# Patient Record
Sex: MALE | Race: WHITE | NOT HISPANIC OR LATINO | Employment: OTHER | URBAN - METROPOLITAN AREA
[De-identification: names, ages, dates, MRNs, and addresses within clinical notes are randomized per-mention and may not be internally consistent; named-entity substitution may affect disease eponyms.]

---

## 2023-10-23 PROBLEM — C44.612 BASAL CELL CARCINOMA OF SKIN OF RIGHT UPPER LIMB, INCLUDING SHOULDER: Status: ACTIVE | Noted: 2023-08-02

## 2023-10-23 PROBLEM — D18.01 HEMANGIOMA OF SKIN AND SUBCUTANEOUS TISSUE: Status: ACTIVE | Noted: 2023-08-02

## 2023-10-23 PROBLEM — C44.529 SQUAMOUS CELL CARCINOMA OF SKIN OF OTHER PART OF TRUNK: Status: ACTIVE | Noted: 2023-08-02

## 2023-10-23 PROBLEM — C44.719 BASAL CELL CARCINOMA OF SKIN OF LEFT LOWER LIMB, INCLUDING HIP: Status: ACTIVE | Noted: 2023-08-02

## 2023-10-23 PROBLEM — E78.5 HYPERLIPIDEMIA: Status: ACTIVE | Noted: 2023-10-23

## 2023-10-23 PROBLEM — L81.4 OTHER MELANIN HYPERPIGMENTATION: Status: ACTIVE | Noted: 2023-08-02

## 2023-10-23 PROBLEM — L82.1 OTHER SEBORRHEIC KERATOSIS: Status: ACTIVE | Noted: 2023-08-02

## 2023-10-23 PROBLEM — D22.39 MELANOCYTIC NEVI OF OTHER PARTS OF FACE: Status: ACTIVE | Noted: 2023-08-02

## 2023-10-23 PROBLEM — D22.30 MELANOCYTIC NEVI OF UNSPECIFIED PART OF FACE: Status: ACTIVE | Noted: 2023-08-02

## 2023-10-23 PROBLEM — C44.319 BASAL CELL CARCINOMA OF SKIN OF OTHER PARTS OF FACE: Status: ACTIVE | Noted: 2023-08-02

## 2023-10-23 PROBLEM — L81.4 LENTIGINES: Status: ACTIVE | Noted: 2023-08-02

## 2023-10-23 PROBLEM — I25.10 ASHD (ARTERIOSCLEROTIC HEART DISEASE): Status: ACTIVE | Noted: 2023-10-23

## 2023-10-23 PROBLEM — C43.62 MALIGNANT MELANOMA OF LEFT UPPER LIMB, INCLUDING SHOULDER (MULTI): Status: ACTIVE | Noted: 2023-08-02

## 2023-10-23 PROBLEM — L73.8 OTHER SPECIFIED FOLLICULAR DISORDERS: Status: ACTIVE | Noted: 2023-08-02

## 2023-10-23 PROBLEM — L57.9 SKIN CHANGES DUE TO CHRONIC EXPOSURE TO NONIONIZING RADIATION, UNSPECIFIED: Status: ACTIVE | Noted: 2023-08-02

## 2023-10-23 PROBLEM — L90.5 SCAR CONDITION AND FIBROSIS OF SKIN: Status: ACTIVE | Noted: 2023-08-02

## 2023-10-23 PROBLEM — L57.0 ACTINIC KERATOSIS: Status: ACTIVE | Noted: 2023-08-02

## 2023-10-23 PROBLEM — D22.4 MELANOCYTIC NEVI OF SCALP AND NECK: Status: ACTIVE | Noted: 2023-08-02

## 2023-10-23 PROBLEM — D48.5 NEOPLASM OF UNCERTAIN BEHAVIOR OF SKIN: Status: ACTIVE | Noted: 2023-08-02

## 2023-10-23 PROBLEM — L91.8 OTHER HYPERTROPHIC DISORDERS OF THE SKIN: Status: ACTIVE | Noted: 2023-08-02

## 2023-10-23 PROBLEM — C44.329 SQUAMOUS CELL CARCINOMA OF SKIN OF OTHER PARTS OF FACE: Status: ACTIVE | Noted: 2023-08-02

## 2023-10-23 PROBLEM — I21.4 NON-ST ELEVATION (NSTEMI) MYOCARDIAL INFARCTION (MULTI): Status: ACTIVE | Noted: 2023-10-23

## 2023-10-23 PROBLEM — R41.3 MEMORY LOSS: Status: ACTIVE | Noted: 2023-10-23

## 2023-10-23 PROBLEM — D49.2 NEOPLASM OF UNSPECIFIED BEHAVIOR OF BONE, SOFT TISSUE, AND SKIN: Status: ACTIVE | Noted: 2023-08-02

## 2023-10-23 PROBLEM — D04.4 CARCINOMA IN SITU OF SKIN OF SCALP AND NECK: Status: ACTIVE | Noted: 2023-08-02

## 2023-10-23 PROBLEM — L91.0 HYPERTROPHIC SCAR: Status: ACTIVE | Noted: 2023-08-02

## 2023-10-23 PROBLEM — Z85.820 PERSONAL HISTORY OF MALIGNANT MELANOMA OF SKIN: Status: ACTIVE | Noted: 2023-08-02

## 2023-10-23 PROBLEM — C44.519 BASAL CELL CARCINOMA OF SKIN OF OTHER PART OF TRUNK: Status: ACTIVE | Noted: 2023-08-02

## 2023-10-23 PROBLEM — D22.62 MELANOCYTIC NEVI OF LEFT UPPER LIMB, INCLUDING SHOULDER: Status: ACTIVE | Noted: 2023-08-02

## 2023-10-23 PROBLEM — D22.5 MELANOCYTIC NEVI OF TRUNK: Status: ACTIVE | Noted: 2023-08-02

## 2023-10-23 PROBLEM — Z85.828 PERSONAL HISTORY OF OTHER MALIGNANT NEOPLASM OF SKIN: Status: ACTIVE | Noted: 2023-08-02

## 2023-10-23 PROBLEM — Z85.820 HISTORY OF MELANOMA: Status: ACTIVE | Noted: 2023-08-02

## 2023-10-23 PROBLEM — D22.70 MELANOCYTIC NEVI OF UNSPECIFIED LOWER LIMB, INCLUDING HIP: Status: ACTIVE | Noted: 2023-08-02

## 2023-10-23 PROBLEM — C44.619 BASAL CELL CARCINOMA OF SKIN OF LEFT UPPER LIMB, INCLUDING SHOULDER: Status: ACTIVE | Noted: 2023-08-02

## 2023-10-23 PROBLEM — D23.9 OTHER BENIGN NEOPLASM OF SKIN, UNSPECIFIED: Status: ACTIVE | Noted: 2023-08-02

## 2023-10-23 PROBLEM — D04.71 CARCINOMA IN SITU OF SKIN OF RIGHT LOWER LIMB, INCLUDING HIP: Status: ACTIVE | Noted: 2023-08-02

## 2023-10-23 RX ORDER — EZETIMIBE 10 MG/1
1 TABLET ORAL DAILY
COMMUNITY
Start: 2021-05-04

## 2023-10-23 RX ORDER — ROSUVASTATIN CALCIUM 40 MG/1
1 TABLET, COATED ORAL DAILY
COMMUNITY
Start: 2016-11-17

## 2023-10-23 RX ORDER — FLUOROURACIL 50 MG/G
1 CREAM TOPICAL
COMMUNITY
Start: 2015-08-11 | End: 2024-05-15 | Stop reason: WASHOUT

## 2023-10-23 RX ORDER — METOPROLOL TARTRATE 25 MG/1
1 TABLET, FILM COATED ORAL 2 TIMES DAILY
COMMUNITY
Start: 2016-07-01

## 2023-10-23 RX ORDER — LOSARTAN POTASSIUM 25 MG/1
1 TABLET ORAL DAILY
COMMUNITY
Start: 2020-01-14

## 2023-10-23 RX ORDER — TRIAMCINOLONE ACETONIDE 1 MG/G
1 CREAM TOPICAL 2 TIMES DAILY PRN
COMMUNITY
Start: 2023-04-25 | End: 2024-05-16 | Stop reason: WASHOUT

## 2023-10-23 RX ORDER — ASPIRIN 81 MG/1
1 TABLET ORAL DAILY
COMMUNITY
Start: 2016-07-06

## 2023-10-23 RX ORDER — EPINEPHRINE 0.22MG
1 AEROSOL WITH ADAPTER (ML) INHALATION DAILY
COMMUNITY
Start: 2016-07-06

## 2023-10-23 RX ORDER — NITROGLYCERIN 0.4 MG/1
0.4 TABLET SUBLINGUAL EVERY 5 MIN PRN
COMMUNITY
Start: 2016-07-06

## 2023-10-23 RX ORDER — DESONIDE 0.5 MG/G
1 CREAM TOPICAL
COMMUNITY
Start: 2015-08-11 | End: 2024-05-15 | Stop reason: WASHOUT

## 2023-10-25 ENCOUNTER — OFFICE VISIT (OUTPATIENT)
Dept: DERMATOLOGY | Facility: CLINIC | Age: 73
End: 2023-10-25
Payer: MEDICARE

## 2023-10-25 DIAGNOSIS — L90.5 SCAR: ICD-10-CM

## 2023-10-25 DIAGNOSIS — L81.4 LENTIGO: ICD-10-CM

## 2023-10-25 DIAGNOSIS — L57.0 BOWENOID ACTINIC KERATOSIS: ICD-10-CM

## 2023-10-25 DIAGNOSIS — L82.1 SEBORRHEIC KERATOSIS: ICD-10-CM

## 2023-10-25 DIAGNOSIS — Z85.828 HISTORY OF BASAL CELL CANCER: ICD-10-CM

## 2023-10-25 DIAGNOSIS — Z12.83 SCREENING EXAM FOR SKIN CANCER: ICD-10-CM

## 2023-10-25 DIAGNOSIS — D18.01 HEMANGIOMA OF SKIN: ICD-10-CM

## 2023-10-25 DIAGNOSIS — L57.0 ACTINIC KERATOSIS: Primary | ICD-10-CM

## 2023-10-25 DIAGNOSIS — Z85.820 PERSONAL HISTORY OF MALIGNANT MELANOMA OF SKIN: ICD-10-CM

## 2023-10-25 DIAGNOSIS — D22.9 MULTIPLE BENIGN NEVI: ICD-10-CM

## 2023-10-25 DIAGNOSIS — Z85.89 HISTORY OF SQUAMOUS CELL CARCINOMA: ICD-10-CM

## 2023-10-25 PROCEDURE — 1126F AMNT PAIN NOTED NONE PRSNT: CPT | Performed by: DERMATOLOGY

## 2023-10-25 PROCEDURE — 17003 DESTRUCT PREMALG LES 2-14: CPT

## 2023-10-25 PROCEDURE — 17000 DESTRUCT PREMALG LESION: CPT

## 2023-10-25 PROCEDURE — 99213 OFFICE O/P EST LOW 20 MIN: CPT | Performed by: DERMATOLOGY

## 2023-10-25 NOTE — PROGRESS NOTES
I saw and evaluated the patient. I personally obtained the key and critical portions of the history and physical exam or was physically present for key and critical portions performed by the resident/fellow. I reviewed the resident/fellow's documentation and discussed the patient with the resident/fellow. I agree with the resident/fellow's medical decision making as documented in the note.    Pk Rodriguez MD

## 2023-10-25 NOTE — PATIENT INSTRUCTIONS
Mr. Rodriguez,     It was great seeing you in Dr. Rodriguez's clinic today.     We saw a few precancerous skin lesions (actinic keratoses) on your skin today which we treated with cryotherapy to prevent them from turning into skin cancer. We also treated the spot on your left medial wrist which we biopsied at the last visit. No spots were concerning for skin cancer. We hope you enjoy your time in [place]! If you would like to see a dermatologist while you are in [place] we recommend Dr. Archana Mejía. Her phone number is [phone].

## 2023-10-25 NOTE — PROGRESS NOTES
Subjective     Regan Rodriguez is a 73 y.o. male with history of multiple melanoma and NMSC who presents for the following: 3-month Skin Check and cryotherapy treatment to biopsy proven bowenoid actinic keratosis on right lateral wrist.     At last visit (8/2/23), patient had two biopsies. Biopsy of mid posterior scalp showed atypical squamous proliferation c/f invasive SCC s/p Mohs with Dr. Quintana on 9/27/23.   Biopsy of right lateral wrist demonstrated bowenoid actinic keratosis. Here today for cryotherapy of bowenoid actinic keratosis on right lateral wrist.  Patient denies any lesions of concern today.     Review of Systems:  No other skin or systemic complaints other than what is documented elsewhere in the note.    The following portions of the chart were reviewed this encounter and updated as appropriate:         Skin Cancer History  - Melanoma 1: Stage IIB (T3N0M0) Year Diagnosed: 2006. February. Location: Right Shoulder. Treatment(s): Wide Local Excision with 2 cm margins and 1 year of interferon Breslow's Thickness: 2.8 mm Negative SLNB Right axilla     - Melanoma 2: Stage IA Year Diagnosed: 2010. December. Location: Right Mid Back. Treatment(s): Wide Local Excision with 1 cm margins Breslow's Thickness: .4 mm No mitotic figures Ulceration present thought to be 2/2 trauma      - Melanoma 3: Year Diagnosed: 2016. October. Location: Left Forearm. Treatment(s): Slow mohs Type: Melanoma insitu     History of NMSC(s)/Dysplastic Nevi   - Moderately dysplastic nevus Treated in 12/2015, 2/2016 (re-excision) Location: Left posterior shoulder Treatment: WLE    - SCCIS. Year Diagnosed: 2013. Location: Right [place]ze. Treatment(s): Mohs.     - BCCx2 Year Diagnosed: 2013. March. Location: right upper back, left lateral leg Treatment(s): R upper back (mohs as it was a recurrence), left lateral leg (ED&C)     - SCCIS. Year Diagnosed: 2012. December. Location: Right Superior Forearm. Treatment(s): Local Excision.     - BCC.  Year Diagnosed: 2012. December. Location: Right Inferior Forearm. Treatment(s): Electrodessication and curretage     - SCCIS. Year Diagnosed: 2012. January. Location: Left Superior Chest. Treatment(s): Electrodessication and curretage     - BCC x 2 Year Diagnosed: 2011. October. Location: Right Chest. Left Forearm. Treatment(s): Electrodessication and curretage     - SCCIS. Year Diagnosed: 2010. December. Location: Right Upper Back. Treatment(s): Electrodessication and curretage     - BCC. Year Diagnosed: 2010. November. Location: Right Preauricular Area. Treatment(s): Mohs.     - BCC. Year Diagnosed: 2010. January. Location: Left Wrist.     - Superficial Basal Cell Carcinoma. Lateral margin involved. Deep margins involved. Year Diagnosed: 2016. May. Location: Left Lateral Arm. Treatment(s): Pending Pathology: [id number]     - Basal Cell Carcinoma. Superficial Basal Cell Carcinoma. Year Diagnosed: 2016. December. Location: Left Anterior Shoulder Treatment(s): C & E w/ KDC on 1/11/17 Pathology: [id number]     - Superficial Basal Cell Carcinoma. Deep margins involved. Year Diagnosed: 2017. June. Location: Right Arm Treatment(s): S/p excision 6/29/17 Pathology: [id number]     - Squamous Cell Carcinoma. ISLateral margin involved. Deep margins involved. Year Diagnosed: 2018. April. Location: Vertex Scalp (Anterior) Treatment(s): S/p Mohs 5/10/18 Pathology: [id number]     - Superficial Basal Cell Carcinoma. Lateral margin involved. Deep margins involved. Year Diagnosed: 2018. April. Location: Right Clavicle. Treatment(s): Exc 6/19/18 Dr. Baron Pathology: [id number]     - AK with focal SCCis Diagnosed: 12/5/2018 Location: Mid-Back Treatment: Excision 2/8/2019 Pathology: [id number]     - Superficial Basal Cell Carcinoma. Lateral margin involved. Deep margins involved. Year Diagnosed: 2019. July. Location: Right Shoulder. Treatment(s): WLE Dr. Quintana 8/28/19 Pathology: [id number]     - Invasive squamous cell carcinoma. Deep margins  involved. Year Diagnosed: 2019. October. Location: Left Hugo. Treatment(s): Mohs Dr. Quintana 12/5/19 Pathology: [id number]     - Invasive squamous cell carcinoma. Deep margins involved. Year Diagnosed: 2019. October. Location: Left Buccal. Cheek. Treatment(s): Mohs 12/12/19 by Dr. Mariano MD Pathology: [id number]     - Actinic Keratosis. Year Diagnosed: 2019. October. Location: Left Vertex. Scalp. Treatment(s): LN2 1/15/2020 Pathology: [id number]     - Actinic Keratosis. Year Diagnosed: 2019. October. Location: Right Lateral Frontal Scalp. Treatment(s): LN2 1/15/2020 Pathology: [id number]     - Superficial Basal Cell Carcinoma. Deep margins involved. Year Diagnosed: 2020. January. Location: Rt Anterior Shoulder Treatment(s): ED&C (Dr. Rodriguez) 4/29/20 Pathology:      - Moderate Dysplastic Nevus. Year Diagnosed: 2020. December. Location: right mid medial back Treatment(s): Wide Local Excision (performed in [place]     - Moderate Dysplastic Nevus. Year Diagnosed: 2020. December.  Location: left mid medial back Treatment(s): Wide Local Excision (performed in [place])    - Nodular Basal Cell Carcinoma. Infiltrative Basal Cell Carcinoma. Lateral margin involved. Deep margins involved. Year Diagnosed: 2021. July. Location: Central Frontal Forehead [facility] Pathology: [id number]     - Proliferative Actinic Keratosis on prior procedural site Year Diagnosed: 2021. July. Location: Right upper chest Treatment(s): pending Pathology: [id number]    -Superficial Basal Cell Carcinoma. reccomending excisionLateral margin involved. Year Diagnosed: 2021. July. Location: left distal leg Treatment(s): pending Pathology: [id number]     -Squamous Cell Carcinoma IS Year Diagnosed: 2021. October. Location: Left Superior flank Treatment(s): excisional biospy 11/30/2021; pending path Pathology: [id number] margins involved.     -Squamous Cell Carcinoma. ISLateral margin involved. Year Diagnosed: 2022. April.  Location: Right Mid Back Treatment(s): WLE 4mm margins Dr Quintana 6/29/22 Pathology: [id number]     - Squamous Cell Carcinoma. ISLateral margin involved. Year Diagnosed: 2022. April. Location: Right Base of Thumb Treatment(s): Mohs Dr. Quintana 6/7/22 Pathology: [id number]    - Atypical squamous proliferation c/f invasive SCC on mid posterior scalp s/p Mohs with Dr. Quintana on 9/27/23.     Specialty Problems          Dermatology Problems    Actinic keratosis    Basal cell carcinoma of skin of left lower limb, including hip    Basal cell carcinoma of skin of left upper limb, including shoulder    Basal cell carcinoma of skin of other part of trunk    Basal cell carcinoma of skin of other parts of face    Basal cell carcinoma of skin of right upper limb, including shoulder    Carcinoma in situ of skin of right lower limb, including hip    Carcinoma in situ of skin of scalp and neck    Hemangioma of skin and subcutaneous tissue    History of melanoma    Hypertrophic scar    Lentigines    Malignant melanoma of left upper limb, including shoulder (CMS/HCC)    Melanocytic nevi of left upper limb, including shoulder    Melanocytic nevi of other parts of face    Melanocytic nevi of scalp and neck    Melanocytic nevi of trunk    Melanocytic nevi of unspecified lower limb, including hip    Melanocytic nevi of unspecified part of face    Neoplasm of uncertain behavior of skin    Other benign neoplasm of skin, unspecified    Other hypertrophic disorders of the skin    Other melanin hyperpigmentation    Other seborrheic keratosis    Other specified follicular disorders    Personal history of malignant melanoma of skin    Personal history of other malignant neoplasm of skin    Scar condition and fibrosis of skin    Skin changes due to chronic exposure to nonionizing radiation, unspecified    Squamous cell carcinoma of skin of other parts of face    Squamous cell carcinoma of skin of other part of trunk        Objective   Well  appearing patient in no apparent distress; mood and affect are within normal limits.    A full examination was performed including scalp, head, eyes, ears, nose, lips, neck, chest, axillae, abdomen, back, buttocks, bilateral upper extremities, bilateral lower extremities, hands, feet, fingers, toes, fingernails, and toenails. All findings within normal limits unless otherwise noted below.    Assessment/Plan   1. Actinic keratosis (5)  Left Parotid Area, Right Dorsal Hand, Right Elbow - Posterior, Right Forehead, Right Mid Helix  Erythematous macules with gritty scale.    Destr of lesion - Left Parotid Area, Right Dorsal Hand, Right Elbow - Posterior, Right Forehead, Right Mid Helix  Complexity: simple    Destruction method: cryotherapy    Informed consent: discussed and consent obtained    Lesion destroyed using liquid nitrogen: Yes    Cryotherapy cycles:  1  Outcome: patient tolerated procedure well with no complications    Post-procedure details: wound care instructions given      2. Bowenoid actinic keratosis  Left Lateral Wrist  -Biopsy results/prior notes indicate biopsy was from right lateral wrist.   -Patient states biopsy was taken from left wrist. Reviewed photographs, appears biopsy was taken from left lateral wrist.       Destr of lesion - Left Lateral Wrist  Complexity: simple    Destruction method: cryotherapy    Informed consent: discussed and consent obtained    Lesion destroyed using liquid nitrogen: Yes    Cryotherapy cycles:  1  Outcome: patient tolerated procedure well with no complications    Post-procedure details: wound care instructions given      3. Lentigo  Scattered tan macules in sun-exposed areas.    - Discussed benign nature and that no treatment is necessary unless it becomes painful or increases in size. Patient opts for clinical monitoring at this time.     4. Hemangioma of skin  Scattered cherry-red papule(s).    - Discussed benign nature and that no treatment is necessary unless it  becomes painful or increases in size. Patient opts for clinical monitoring at this time.     5. Multiple benign nevi  Scattered, uniform and benign-appearing, regular brown melanocytic papules and macules.    - Discussed benign nature and that no treatment is necessary unless it becomes painful or increases in size. Patient opts for clinical monitoring at this time.     6. Seborrheic keratosis  Stuck on verrucous, tan-brown papules and plaques.      - Discussed benign nature and that no treatment is necessary unless it becomes painful or increases in size. Patient opts for clinical monitoring at this time.     7. Scar  Surgical scars are well-healed with no evidence of recurrence    8. Screening exam for skin cancer    Full body skin exam  -No lesions concerning for malignancy on the remainder the skin exam today   -Scars from prior skin treatments were evaluated and no evidence of recurrence.  - The ugly duckling sign was discussed. Monitor for any skin lesions that are different in color, shape, or size than others on body  -Sun protection was discussed. Recommend SPF 30+, hats with brims, sun protective clothing, and avoiding sun exposure between 10 AM and 2 PM whenever possible  -Recommend regular skin exams or sooner if new or changing lesions       9. Personal history of malignant melanoma of skin  -No palpable lymphadenopathy in the cervical, axillary, inguinal, and popliteal lymph nodes.       10. History of squamous cell carcinoma    11. History of basal cell cancer      Discussed RTC options. Patient goes to [place] for the winter. He is hesitant to go 5 months without a skin exam. Recommended patient to see Dr. Archana Mejía while he is [place] for a FBSE. Patient will call office when he is back from [place] to schedule next FBSE.

## 2024-05-15 PROBLEM — I10 HTN (HYPERTENSION): Status: ACTIVE | Noted: 2024-05-15

## 2024-05-15 PROBLEM — J92.9 PLEURAL THICKENING: Status: ACTIVE | Noted: 2024-05-15

## 2024-05-15 PROBLEM — R73.03 PREDIABETES: Status: ACTIVE | Noted: 2024-05-15

## 2024-05-15 PROBLEM — E78.00 HYPERCHOLESTEROLEMIA: Status: ACTIVE | Noted: 2024-05-15

## 2024-05-16 ENCOUNTER — OFFICE VISIT (OUTPATIENT)
Dept: CARDIOLOGY | Facility: CLINIC | Age: 74
End: 2024-05-16
Payer: MEDICARE

## 2024-05-16 VITALS
HEART RATE: 62 BPM | WEIGHT: 211.25 LBS | BODY MASS INDEX: 30.31 KG/M2 | DIASTOLIC BLOOD PRESSURE: 65 MMHG | OXYGEN SATURATION: 95 % | SYSTOLIC BLOOD PRESSURE: 109 MMHG

## 2024-05-16 DIAGNOSIS — E78.00 HYPERCHOLESTEROLEMIA: ICD-10-CM

## 2024-05-16 DIAGNOSIS — I25.10 ASHD (ARTERIOSCLEROTIC HEART DISEASE): Primary | ICD-10-CM

## 2024-05-16 DIAGNOSIS — I10 PRIMARY HYPERTENSION: ICD-10-CM

## 2024-05-16 DIAGNOSIS — I21.4 NON-ST ELEVATION (NSTEMI) MYOCARDIAL INFARCTION (MULTI): ICD-10-CM

## 2024-05-16 PROCEDURE — 99214 OFFICE O/P EST MOD 30 MIN: CPT | Performed by: INTERNAL MEDICINE

## 2024-05-16 PROCEDURE — 1126F AMNT PAIN NOTED NONE PRSNT: CPT | Performed by: INTERNAL MEDICINE

## 2024-05-16 PROCEDURE — 93010 ELECTROCARDIOGRAM REPORT: CPT | Performed by: INTERNAL MEDICINE

## 2024-05-16 PROCEDURE — 1036F TOBACCO NON-USER: CPT | Performed by: INTERNAL MEDICINE

## 2024-05-16 PROCEDURE — 3078F DIAST BP <80 MM HG: CPT | Performed by: INTERNAL MEDICINE

## 2024-05-16 PROCEDURE — 1159F MED LIST DOCD IN RCRD: CPT | Performed by: INTERNAL MEDICINE

## 2024-05-16 PROCEDURE — 1160F RVW MEDS BY RX/DR IN RCRD: CPT | Performed by: INTERNAL MEDICINE

## 2024-05-16 PROCEDURE — 93005 ELECTROCARDIOGRAM TRACING: CPT | Performed by: INTERNAL MEDICINE

## 2024-05-16 PROCEDURE — 3074F SYST BP LT 130 MM HG: CPT | Performed by: INTERNAL MEDICINE

## 2024-05-16 ASSESSMENT — COLUMBIA-SUICIDE SEVERITY RATING SCALE - C-SSRS
6. HAVE YOU EVER DONE ANYTHING, STARTED TO DO ANYTHING, OR PREPARED TO DO ANYTHING TO END YOUR LIFE?: NO
2. HAVE YOU ACTUALLY HAD ANY THOUGHTS OF KILLING YOURSELF?: NO
1. IN THE PAST MONTH, HAVE YOU WISHED YOU WERE DEAD OR WISHED YOU COULD GO TO SLEEP AND NOT WAKE UP?: NO

## 2024-05-16 ASSESSMENT — PATIENT HEALTH QUESTIONNAIRE - PHQ9
SUM OF ALL RESPONSES TO PHQ9 QUESTIONS 1 AND 2: 0
2. FEELING DOWN, DEPRESSED OR HOPELESS: NOT AT ALL
1. LITTLE INTEREST OR PLEASURE IN DOING THINGS: NOT AT ALL

## 2024-05-16 ASSESSMENT — ENCOUNTER SYMPTOMS
DEPRESSION: 0
LOSS OF SENSATION IN FEET: 0
OCCASIONAL FEELINGS OF UNSTEADINESS: 0

## 2024-05-16 ASSESSMENT — PAIN SCALES - GENERAL: PAINLEVEL: 0-NO PAIN

## 2024-05-16 NOTE — PATIENT INSTRUCTIONS
You are doing well.  Continue exercising, walking 3 miles every other day is a good start.  Obtain fasting blood tests at your request.  We will review and call you if there are any changes.  Continue all medications.  Yearly follow-up.

## 2024-05-16 NOTE — PROGRESS NOTES
Primary Care Physician: Vito Dior DO  Date of Visit: 05/16/2024 11:00 AM EDT  Location of visit: [facility] [address]   Last office visit: May 15, 2023    Chief Complaint:     Follow-up, Hypertension, and Hyperlipidemia (Atherosclerotic Heart Disease)     HPI/Summary  Regan Rodriguez is a 73 y.o. male who presents for followup cardiology evaluation.     In May, 2016 he presented with non-STEMI and underwent staged PCI with placement of 2 stents to the circumflex and 1 stent to the mid RCA, followed by placement of a stent to the proximal LAD 1 day later.  A stress echocardiogram in 2017 was negative at a workload of 13.3 METS.  No recent cardiac testing.    He remains on aspirin, high-dose rosuvastatin plus ezetimibe, losartan and metoprolol.    The patient is feeling well.  He exercises when he is down in [place], walking a distance of 3 miles or more every other day.  He recently purchased a home in [place], may relocate full-time in the future.  No cardiac symptoms.  No medication related side effects.  Denies dyspnea, PND, orthopnea, palpitations, lightheadedness and syncope.  Specialty Problems          Cardiology Problems    ASHD (arteriosclerotic heart disease)    Hyperlipidemia    Non-ST elevation (NSTEMI) myocardial infarction (Multi)    HTN (hypertension)    Hypercholesterolemia        Past Medical History:   Diagnosis Date    Other specified health status     No pertinent past medical history    Personal history of malignant melanoma of skin 12/12/2014    History of malignant melanoma of skin    Personal history of other diseases of the circulatory system 12/12/2014    History of pericarditis          No past surgical history on file.         Social History     Tobacco Use    Smoking status: Never    Smokeless tobacco: Never        Physical Activity: Not on file            No Known Allergies      Current Outpatient Medications   Medication Instructions    aspirin 81 mg EC tablet 1 tablet, oral, Daily     "coenzyme Q-10 100 mg capsule 1 capsule, oral, Daily    ezetimibe (Zetia) 10 mg tablet 1 tablet, oral, Daily    losartan (Cozaar) 25 mg tablet 1 tablet, oral, Daily    metoprolol tartrate (Lopressor) 25 mg tablet 1 tablet, oral, 2 times daily    nitroglycerin (NITROSTAT) 0.4 mg, sublingual, Every 5 min PRN, FOR UP TO 3 DOSES AS NEEDED FOR CHEST PAIN.CALL 911 IF PAIN PERSISTS.    rosuvastatin (Crestor) 40 mg tablet 1 tablet, oral, Daily    triamcinolone (Kenalog) 0.1 % cream 1 Application, Topical, 2 times daily PRN       ROS     Vital Signs:  Vitals:    05/16/24 1133   BP: 109/65   BP Location: Left arm   Patient Position: Sitting   BP Cuff Size: Large adult   Pulse: 62   SpO2: 95%   Weight: 95.8 kg (211 lb 4 oz)     Wt Readings from Last 2 Encounters:   05/16/24 95.8 kg (211 lb 4 oz)   05/15/23 95.7 kg (211 lb 1 oz)     Body mass index is 30.31 kg/m².       Physical Exam:    On examination he appeared in good health and spirits. Vital signs as documented. Skin warm and dry and without overt rashes. Neck without JVD. Lungs clear. Heart exam notable for regular rhythm, normal sounds and absence of murmurs, rubs or gallops. Abdomen unremarkable and without evidence of organomegaly, masses, or abdominal aortic enlargement. Extremities nonedematous.     Last Labs:  CMP:No results for input(s): \"NA\", \"K\", \"CL\", \"CO2\", \"ANIONGAP\", \"BUN\", \"CREATININE\", \"EGFR\", \"GLUCOSE\" in the last 76528 hours.No results for input(s): \"ALBUMIN\", \"ALKPHOS\", \"ALT\", \"AST\", \"BILITOT\", \"LIPASE\" in the last 27771 hours.    No lab exists for component: \"CA\"  CBC:No results for input(s): \"WBC\", \"HGB\", \"HCT\", \"PLT\", \"MCV\" in the last 94717 hours.  COAG: No results for input(s): \"INR\", \"DDIMERVTE\" in the last 33310 hours.  HEME/ENDO:No results for input(s): \"FERRITIN\", \"IRONSAT\", \"TSH\", \"HGBA1C\" in the last 27710 hours.   CARDIAC: No results for input(s): \"LDH\", \"CKMB\", \"TROPHS\", \"BNP\" in the last 03831 hours.    No lab exists for component: \"CK\", " "\"CKMBP\"  Recent Labs     04/25/19  1155   CHOL 140   LDLF 73   HDL 49.3   TRIG 90       Last Cardiology Tests:    ECG:    We obtained a tracing today that shows normal sinus rhythm and incomplete right bundle branch block.    Echo:  Echo Results:  No results found for this or any previous visit from the past 3650 days.       Cath:      Stress Test:  Stress Results:  No results found for this or any previous visit from the past 365 days.         Cardiac Imaging:        Assessment/Plan     In summary, the patient is doing very well, 8 years status post non-ST elevation myocardial infarction and multivessel PCI.  The ejection fraction was normal when last assessed.  No need for another stress test.  With the addition of ezetimibe, his lipids are at goal.  Hypertriglyceridemia has resolved.  We will repeat a lipid panel and a comprehensive metabolic panel at his local hospital.  Yearly cardiology clinic visit is appropriate.      Orders:  Orders Placed This Encounter   Procedures    ECG 12 lead (Clinic Performed)      Followup Appts:  No future appointments.        ____________________________________________________________  Pérez Orozco MD    Senior Attending Physician  [facility]  [facility]    [facility] Chair for Cardiovascular Excellence  [facility] [employer]  "

## 2024-05-22 ENCOUNTER — TELEPHONE (OUTPATIENT)
Dept: SCHEDULING | Age: 74
End: 2024-05-22
Payer: MEDICARE

## 2024-05-22 NOTE — TELEPHONE ENCOUNTER
Laboratory tests from [facility] reviewed.  Lipids are at goal.  Chemistry panel normal except for modestly elevated fasting blood glucose.  Left message on patient's voicemail.  Should probably follow-up with his primary care provider to check a hemoglobin A1c and assess for prediabetes.

## 2024-06-05 LAB
ATRIAL RATE: 61 BPM
P AXIS: 55 DEGREES
P OFFSET: 192 MS
P ONSET: 130 MS
PR INTERVAL: 186 MS
Q ONSET: 223 MS
QRS COUNT: 10 BEATS
QRS DURATION: 106 MS
QT INTERVAL: 424 MS
QTC CALCULATION(BAZETT): 426 MS
QTC FREDERICIA: 426 MS
R AXIS: 21 DEGREES
T AXIS: 37 DEGREES
T OFFSET: 435 MS
VENTRICULAR RATE: 61 BPM

## 2024-06-17 ENCOUNTER — TELEPHONE (OUTPATIENT)
Dept: SCHEDULING | Age: 74
End: 2024-06-17
Payer: MEDICARE

## 2024-07-06 DIAGNOSIS — I25.10 ASHD (ARTERIOSCLEROTIC HEART DISEASE): Primary | ICD-10-CM

## 2024-07-08 RX ORDER — ROSUVASTATIN CALCIUM 40 MG/1
40 TABLET, COATED ORAL DAILY
Qty: 90 TABLET | Refills: 3 | Status: SHIPPED | OUTPATIENT
Start: 2024-07-08

## 2024-07-08 RX ORDER — EZETIMIBE 10 MG/1
10 TABLET ORAL DAILY
Qty: 90 TABLET | Refills: 3 | Status: SHIPPED | OUTPATIENT
Start: 2024-07-08

## 2024-07-08 RX ORDER — METOPROLOL TARTRATE 25 MG/1
25 TABLET, FILM COATED ORAL 2 TIMES DAILY
Qty: 180 TABLET | Refills: 3 | Status: SHIPPED | OUTPATIENT
Start: 2024-07-08

## 2024-07-08 RX ORDER — LOSARTAN POTASSIUM 25 MG/1
25 TABLET ORAL DAILY
Qty: 90 TABLET | Refills: 3 | Status: SHIPPED | OUTPATIENT
Start: 2024-07-08

## 2025-05-15 ENCOUNTER — APPOINTMENT (OUTPATIENT)
Dept: CARDIOLOGY | Facility: CLINIC | Age: 75
End: 2025-05-15
Payer: MEDICARE

## 2025-05-15 VITALS
HEART RATE: 63 BPM | OXYGEN SATURATION: 98 % | SYSTOLIC BLOOD PRESSURE: 148 MMHG | WEIGHT: 209.1 LBS | HEIGHT: 70 IN | DIASTOLIC BLOOD PRESSURE: 73 MMHG | BODY MASS INDEX: 29.94 KG/M2

## 2025-05-15 DIAGNOSIS — I21.4 NON-ST ELEVATION (NSTEMI) MYOCARDIAL INFARCTION (MULTI): ICD-10-CM

## 2025-05-15 DIAGNOSIS — I25.10 ASHD (ARTERIOSCLEROTIC HEART DISEASE): ICD-10-CM

## 2025-05-15 DIAGNOSIS — I10 PRIMARY HYPERTENSION: Primary | ICD-10-CM

## 2025-05-15 PROCEDURE — 99214 OFFICE O/P EST MOD 30 MIN: CPT | Performed by: INTERNAL MEDICINE

## 2025-05-15 PROCEDURE — 3077F SYST BP >= 140 MM HG: CPT | Performed by: INTERNAL MEDICINE

## 2025-05-15 PROCEDURE — 1126F AMNT PAIN NOTED NONE PRSNT: CPT | Performed by: INTERNAL MEDICINE

## 2025-05-15 PROCEDURE — 93005 ELECTROCARDIOGRAM TRACING: CPT | Performed by: INTERNAL MEDICINE

## 2025-05-15 PROCEDURE — 99212 OFFICE O/P EST SF 10 MIN: CPT | Mod: 25 | Performed by: INTERNAL MEDICINE

## 2025-05-15 PROCEDURE — 93010 ELECTROCARDIOGRAM REPORT: CPT | Performed by: INTERNAL MEDICINE

## 2025-05-15 PROCEDURE — 1036F TOBACCO NON-USER: CPT | Performed by: INTERNAL MEDICINE

## 2025-05-15 PROCEDURE — 3078F DIAST BP <80 MM HG: CPT | Performed by: INTERNAL MEDICINE

## 2025-05-15 ASSESSMENT — COLUMBIA-SUICIDE SEVERITY RATING SCALE - C-SSRS
2. HAVE YOU ACTUALLY HAD ANY THOUGHTS OF KILLING YOURSELF?: NO
1. IN THE PAST MONTH, HAVE YOU WISHED YOU WERE DEAD OR WISHED YOU COULD GO TO SLEEP AND NOT WAKE UP?: NO
6. HAVE YOU EVER DONE ANYTHING, STARTED TO DO ANYTHING, OR PREPARED TO DO ANYTHING TO END YOUR LIFE?: NO

## 2025-05-15 ASSESSMENT — PATIENT HEALTH QUESTIONNAIRE - PHQ9
2. FEELING DOWN, DEPRESSED OR HOPELESS: NOT AT ALL
SUM OF ALL RESPONSES TO PHQ9 QUESTIONS 1 AND 2: 0
1. LITTLE INTEREST OR PLEASURE IN DOING THINGS: NOT AT ALL

## 2025-05-15 ASSESSMENT — ENCOUNTER SYMPTOMS
LOSS OF SENSATION IN FEET: 0
OCCASIONAL FEELINGS OF UNSTEADINESS: 0
DEPRESSION: 0

## 2025-05-15 ASSESSMENT — PAIN SCALES - GENERAL: PAINLEVEL_OUTOF10: 0-NO PAIN

## 2025-05-15 NOTE — PROGRESS NOTES
Primary Care Physician: Vito Dior DO  Date of Visit: 05/15/2025 11:00 AM EDT  [place] [facility] [place] [address]   Last office visit: 5/16/2024    Chief Complaint:     Follow-up CAD    HPI/Summary  Regan Rodriguez is a 74 y.o. male who presents for followup cardiology evaluation.     In May, 2016 he presented with non-STEMI and underwent staged PCI with placement of 2 stents to the circumflex and 1 stent to the mid RCA, followed by placement of a stent to the proximal LAD 1 day later. A stress echocardiogram in 2017 was negative at a workload of 13.3 METS. No recent cardiac testing.       Currently, feeling well.  He walks out-of-doors for distance of 3 miles at least several days weekly.  No exertional dyspnea, angina, or other cardiovascular symptoms.    Laboratory testing performed at an outside hospital on May 22, 2024 showed a normal comprehensive panel except for a blood glucose of 122.  Cholesterol 106, HDL 42, LDL 40, triglycerides 117, and hemoglobin A1c 5.7%.      Specialty Problems          Cardiology Problems    ASHD (arteriosclerotic heart disease)    Hyperlipidemia    Non-ST elevation (NSTEMI) myocardial infarction (Multi)    HTN (hypertension)    Hypercholesterolemia      Social History[1]   RX Allergies[2]  Current Outpatient Medications   Medication Instructions    aspirin 81 mg EC tablet 1 tablet, oral, Daily    coenzyme Q-10 100 mg capsule 1 capsule, oral, Daily    ezetimibe (ZETIA) 10 mg, oral, Daily    losartan (COZAAR) 25 mg, oral, Daily    metoprolol tartrate (LOPRESSOR) 25 mg, oral, 2 times daily    nitroglycerin (NITROSTAT) 0.4 mg, sublingual, Every 5 min PRN, FOR UP TO 3 DOSES AS NEEDED FOR CHEST PAIN.CALL 911 IF PAIN PERSISTS.    rosuvastatin (CRESTOR) 40 mg, oral, Daily       ROS    Vital Signs:  Vitals:    05/15/25 1114   BP: 148/73   BP Location: Left arm   Patient Position: Sitting   BP Cuff Size: Large adult   Pulse: 63   SpO2: 98%   Weight: 94.8 kg (209 lb 1.6 oz)   Height:  "1.778 m (5' 10\")     Wt Readings from Last 2 Encounters:   05/15/25 94.8 kg (209 lb 1.6 oz)   05/16/24 95.8 kg (211 lb 4 oz)     Body mass index is 30 kg/m².     Physical Exam:    On examination he appeared in good health and spirits. Vital signs as documented. Skin warm and dry and without overt rashes. Neck without JVD. Lungs clear. Heart exam notable for regular rhythm, normal sounds and absence of murmurs, rubs or gallops. Abdomen unremarkable and without evidence of organomegaly, masses, or abdominal aortic enlargement. Extremities nonedematous.     Lab Review:  CBC:  No results found for: \"WBC\", \"HGB\", \"HCT\", \"MCV\", \"PLT\"    CMP:  No results for input(s): \"GLUCOSE\", \"NA\", \"K\", \"CL\", \"CO2\", \"ANIONGAP\", \"BUN\", \"CREATININE\", \"EGFR\", \"ALBUMIN\", \"ALKPHOS\", \"PROT\", \"ALT\", \"AST\", \"BILITOT\" in the last 92575 hours.    No lab exists for component: \"CA\"      LIPID PANEL:  Lab Results   Component Value Date    CHOL 140 04/25/2019    HDL 49.3 04/25/2019    CHHDL 2.8 04/25/2019    VLDL 18 04/25/2019    TRIG 90 04/25/2019       HEME/ENDO:  No results found for: \"HGBA1C\", \"TSH\"      No results for input(s): \"BNP\" in the last 23752 hours.  Recent Cardiology Tests:    ECG:    ECG today shows normal sinus rhythm and incomplete right bundle branch block.    Results for orders placed in visit on 05/16/24    ECG 12 lead (Clinic Performed)    Narrative  Normal sinus rhythm  Incomplete right bundle branch block  Borderline ECG  When compared with ECG of 15-MAY-2023 11:39,  No significant change was found  Confirmed by Pérez Orozco ([id number]) on 6/5/2024 8:37:08 AM       Echo:  Echo Results:  No results found for this or any previous visit from the past 3650 days.       Cath:      Stress Test:  Stress Results:  No results found for this or any previous visit from the past 365 days.         Cardiac Imaging:        Assessment/Plan   The patient is doing very well, 9 years status post non-STEMI and multivessel PCI.  Ejection fraction was " normal when last assessed.  No indication to perform another stress test at this time.  Lipids at goal after the addition of ezetimibe to his regimen.  Hypertriglyceridemia has resolved.    He is going to be relocating to the [facility] area, and we will renew his prescriptions for 4 next 6 months.  We recommended that he establish with a  primary care provider and then attain a referral, as needed, to cardiology.  Records can be provided.    Orders:  Orders Placed This Encounter   Procedures    ECG 12 Lead      Followup Appts:  No future appointments.        ____________________________________________________________  Pérez Orozco MD    Senior Attending Physician  [facility] [place]  [facility]    [facility] Chair for Cardiovascular Excellence  [facility] [employer]       [1]   Social History  Tobacco Use    Smoking status: Never    Smokeless tobacco: Never   Substance Use Topics    Alcohol use: Yes     Alcohol/week: 2.0 standard drinks of alcohol     Types: 2 Cans of beer per week    Drug use: Never   [2] No Known Allergies

## 2025-05-16 LAB
ATRIAL RATE: 63 BPM
P AXIS: 10 DEGREES
P OFFSET: 195 MS
P ONSET: 127 MS
PR INTERVAL: 194 MS
Q ONSET: 224 MS
QRS COUNT: 10 BEATS
QRS DURATION: 104 MS
QT INTERVAL: 418 MS
QTC CALCULATION(BAZETT): 427 MS
QTC FREDERICIA: 424 MS
R AXIS: -3 DEGREES
T AXIS: 13 DEGREES
T OFFSET: 433 MS
VENTRICULAR RATE: 63 BPM

## 2025-05-20 ENCOUNTER — TELEPHONE (OUTPATIENT)
Dept: SCHEDULING | Age: 75
End: 2025-05-20
Payer: MEDICARE

## 2025-05-20 DIAGNOSIS — I25.10 ASHD (ARTERIOSCLEROTIC HEART DISEASE): ICD-10-CM

## 2025-05-20 RX ORDER — METOPROLOL TARTRATE 25 MG/1
25 TABLET, FILM COATED ORAL 2 TIMES DAILY
Qty: 180 TABLET | Refills: 3 | Status: SHIPPED | OUTPATIENT
Start: 2025-05-20 | End: 2026-05-20

## 2025-05-20 RX ORDER — ROSUVASTATIN CALCIUM 40 MG/1
40 TABLET, COATED ORAL DAILY
Qty: 90 TABLET | Refills: 3 | Status: SHIPPED | OUTPATIENT
Start: 2025-05-20 | End: 2026-05-20

## 2025-05-20 RX ORDER — LOSARTAN POTASSIUM 25 MG/1
25 TABLET ORAL DAILY
Qty: 90 TABLET | Refills: 3 | Status: SHIPPED | OUTPATIENT
Start: 2025-05-20 | End: 2026-05-20

## 2025-05-20 RX ORDER — EZETIMIBE 10 MG/1
10 TABLET ORAL DAILY
Qty: 90 TABLET | Refills: 3 | Status: SHIPPED | OUTPATIENT
Start: 2025-05-20 | End: 2026-05-20